# Patient Record
Sex: MALE | Race: WHITE | URBAN - METROPOLITAN AREA
[De-identification: names, ages, dates, MRNs, and addresses within clinical notes are randomized per-mention and may not be internally consistent; named-entity substitution may affect disease eponyms.]

---

## 2020-06-11 ENCOUNTER — OFFICE VISIT (OUTPATIENT)
Dept: URBAN - METROPOLITAN AREA CLINIC 54 | Facility: CLINIC | Age: 45
End: 2020-06-11

## 2020-06-12 ENCOUNTER — OFFICE VISIT (OUTPATIENT)
Dept: URBAN - METROPOLITAN AREA CLINIC 54 | Facility: CLINIC | Age: 45
End: 2020-06-12

## 2020-07-07 ENCOUNTER — TELEPHONE ENCOUNTER (OUTPATIENT)
Dept: URBAN - METROPOLITAN AREA CLINIC 54 | Facility: CLINIC | Age: 45
End: 2020-07-07

## 2020-07-07 RX ORDER — COLESEVELAM HYDROCHLORIDE 625 MG/1
TAKE 3 TABLETS (1,875 MG) BY ORAL ROUTE 2 TIMES PER DAY WITH MEALS AND LIQUID FOR 30 DAYS TABLET, FILM COATED ORAL 2 TIMES DAILY
Qty: 180 | Refills: 1

## 2020-07-08 ENCOUNTER — OFFICE VISIT (OUTPATIENT)
Dept: URBAN - METROPOLITAN AREA CLINIC 54 | Facility: CLINIC | Age: 45
End: 2020-07-08

## 2020-10-23 ENCOUNTER — WEB ENCOUNTER (OUTPATIENT)
Dept: URBAN - METROPOLITAN AREA CLINIC 54 | Facility: CLINIC | Age: 45
End: 2020-10-23

## 2020-10-23 ENCOUNTER — OFFICE VISIT (OUTPATIENT)
Dept: URBAN - METROPOLITAN AREA CLINIC 54 | Facility: CLINIC | Age: 45
End: 2020-10-23
Payer: COMMERCIAL

## 2020-10-23 DIAGNOSIS — K52.9 CHRONIC DIARRHEA: ICD-10-CM

## 2020-10-23 DIAGNOSIS — K58.9 IBS (IRRITABLE BOWEL SYNDROME): ICD-10-CM

## 2020-10-23 PROCEDURE — G8427 DOCREV CUR MEDS BY ELIG CLIN: HCPCS | Performed by: INTERNAL MEDICINE

## 2020-10-23 PROCEDURE — G8482 FLU IMMUNIZE ORDER/ADMIN: HCPCS | Performed by: INTERNAL MEDICINE

## 2020-10-23 PROCEDURE — G9903 PT SCRN TBCO ID AS NON USER: HCPCS | Performed by: INTERNAL MEDICINE

## 2020-10-23 PROCEDURE — 91065 BREATH HYDROGEN/METHANE TEST: CPT | Performed by: INTERNAL MEDICINE

## 2020-10-23 PROCEDURE — 99214 OFFICE O/P EST MOD 30 MIN: CPT | Performed by: INTERNAL MEDICINE

## 2020-10-23 PROCEDURE — G8420 CALC BMI NORM PARAMETERS: HCPCS | Performed by: INTERNAL MEDICINE

## 2020-10-23 RX ORDER — COLESEVELAM HYDROCHLORIDE 625 MG/1
TAKE 3 TABLETS (1,875 MG) BY ORAL ROUTE 2 TIMES PER DAY WITH MEALS AND LIQUID TABLET, FILM COATED ORAL 2 TIMES DAILY
Qty: 360 | Refills: 3

## 2020-10-23 RX ORDER — COLESEVELAM HYDROCHLORIDE 625 MG/1
TAKE 3 TABLETS (1,875 MG) BY ORAL ROUTE 2 TIMES PER DAY WITH MEALS AND LIQUID FOR 30 DAYS TABLET, FILM COATED ORAL 2 TIMES DAILY
Qty: 180 | Refills: 1 | Status: ACTIVE | COMMUNITY

## 2020-10-23 RX ORDER — CETIRIZINE HYDROCHLORIDE 10 MG/1
TAKE 1 TABLET (10 MG) BY ORAL ROUTE ONCE DAILY TABLET, FILM COATED ORAL 1
Qty: 0 | Refills: 0 | Status: ACTIVE | COMMUNITY
Start: 1900-01-01

## 2020-10-23 NOTE — HPI-OTHER HISTORIES
>>prior visits  The patient is a 40 year old male, who presents in follow-up. On his initial visit on 9/8/15 he stated 2 months prior he began having intermittent epigastric pain. It at first was extreme prompting an ED visit at [facility]. He stated his gallbladder was evaluated and it was unremarkable. His pain resolved after a couple hours, but was recurrent. It reoccured with indigestion/burping/and upper abdominal distention. TUMS helped. He stated he had an H pylori testing by Dr. Gray and he believed it was negative. On his visit on 10/13/15 the epigastric pain had resolved after starting omeprazole 40 mg daily. On 10/13/15 he noted being sore around his diarphragm 4-5 days out of the week lasting for hours with no pattern. A HIDA scan on 11/17/15 noted a decrease in EF at 40%, but he did not have any symptoms at the time of gallbladder contraction. He took Ibuprofen 600-800 mg TID and his subcostal pain resolved. On todays visit he notes he has done well until 2 weeks ago. He began having "nervous stomach" and increased his amitriptyline to 10 mg daily. He had terrible nightmares on the 10 mg dose so he decreased by to 5 mg nightly. His nightmares resolved. He is now taking 5 mg QOD. He states he can hear "squirts" in his abdomen i.e. bowel sounds in a quiet room. He states he feels a burning sensation throughout his abdomen. He has 2-3 BMS every morning that can be watery. It can limit his function int he morning. He states he is not under alot of stress. He denies burping and abdominal distention today. He has noted in the past problems with a soft/loose stool in the am. It occurs post prandially 1-3 stools in the am. He has noted in the late pm after dinner that he will have distention/belching continuously if he eats a large meal. Given these symptoms I initially treated him for IBS/functional dyspepsia with amitriptyline 10 mg at bedtime. He reduced it to a 1/2 tablet because of somnolence. After starting amitriptyline he did not need hysocyamine. He continued taking Imodium 1 pill nightly and his diarrhea improved. He decreased soda intake as well and his burping/abdominal distention were better. He gets alot of exercise at work shoveling alot. 5/29/18: Postprandial diarrhea and cramping better off dairy. Stopped amitryptyline due to vivid dreams.Son with lactase deficiency. GB EF 20%. Normal EGD and colon. Moderate stress. 2/5/19: Classic IBS-D symptoms. Cramping and audible bowel sounds. No response with VSL and FODMAP diet. No alarms ymptoms. Normal GB US but worried about GB. Denies stress. Breath test negative. >>1/7/2020 Much less diarrhea on Welchol, often only 1 formed stool/day on most days. No bleeding, rare RUQ discomfort after greasy food.

## 2020-10-23 NOTE — HPI-TODAY'S VISIT:
Follow-up for this 45-year-old male with ongoing abdominal symptoms intermittent diarrhea and cramping. He has a long history of the same symptoms although recently become worse despite taking WelChol twice daily.  He tried reducing the WelChol but had increasing symptoms and is increased again.  He recently had taken some antibiotics but symptoms were occurring prior to the antibiotic usage.  He has noticed possible intolerance to caffeine or fructose.  He previously had a normal lactose tolerance test and normal upper endoscopy with biopsies and a normal colonoscopy with random biopsies.  He has had a borderline HIDA scan and a normal abdominal ultrasound.  His weight has been stable.  He feels generally well with no cardiac or respiratory problems.

## 2020-12-29 ENCOUNTER — OFFICE VISIT (OUTPATIENT)
Dept: RURAL CLINIC 6 | Facility: CLINIC | Age: 45
End: 2020-12-29
Payer: COMMERCIAL

## 2020-12-29 DIAGNOSIS — K58.9 IBS (IRRITABLE BOWEL SYNDROME): ICD-10-CM

## 2020-12-29 PROCEDURE — G9903 PT SCRN TBCO ID AS NON USER: HCPCS | Performed by: INTERNAL MEDICINE

## 2020-12-29 PROCEDURE — G8420 CALC BMI NORM PARAMETERS: HCPCS | Performed by: INTERNAL MEDICINE

## 2020-12-29 PROCEDURE — 99213 OFFICE O/P EST LOW 20 MIN: CPT | Performed by: INTERNAL MEDICINE

## 2020-12-29 PROCEDURE — G8482 FLU IMMUNIZE ORDER/ADMIN: HCPCS | Performed by: INTERNAL MEDICINE

## 2020-12-29 PROCEDURE — G8427 DOCREV CUR MEDS BY ELIG CLIN: HCPCS | Performed by: INTERNAL MEDICINE

## 2020-12-29 RX ORDER — CETIRIZINE HYDROCHLORIDE 10 MG/1
TAKE 1 TABLET (10 MG) BY ORAL ROUTE ONCE DAILY TABLET, FILM COATED ORAL 1
Qty: 0 | Refills: 0 | Status: ACTIVE | COMMUNITY
Start: 1900-01-01

## 2020-12-29 RX ORDER — COLESEVELAM HYDROCHLORIDE 625 MG/1
TAKE 3 TABLETS (1,875 MG) BY ORAL ROUTE 2 TIMES PER DAY WITH MEALS AND LIQUID TABLET, FILM COATED ORAL 2 TIMES DAILY
Qty: 360 | Refills: 3 | Status: ACTIVE | COMMUNITY

## 2020-12-29 NOTE — HPI-TODAY'S VISIT:
He has had sinus infection twice within the last few months and has postponed doing his SIBO test because of the antibiotic treatment he has been receiving.  He has noted intolerance to additional foods particularly onions and garlic.  He is actually had some improvement in digestive symptoms after the antibiotic regimens.  He has not had much diarrhea and has no bleeding and only mild gaseousness and pain.  His weight is remained stable.  He has no cardiac or respiratory issues.   >>last visit Follow-up for this 45-year-old male with ongoing abdominal symptoms intermittent diarrhea and cramping. He has a long history of the same symptoms although recently become worse despite taking WelChol twice daily.  He tried reducing the WelChol but had increasing symptoms and is increased again.  He recently had taken some antibiotics but symptoms were occurring prior to the antibiotic usage.  He has noticed possible intolerance to caffeine or fructose.  He previously had a normal lactose tolerance test and normal upper endoscopy with biopsies and a normal colonoscopy with random biopsies.  He has had a borderline HIDA scan and a normal abdominal ultrasound.  His weight has been stable.  He feels generally well with no cardiac or respiratory problems.

## 2021-04-27 ENCOUNTER — OFFICE VISIT (OUTPATIENT)
Dept: RURAL CLINIC 6 | Facility: CLINIC | Age: 46
End: 2021-04-27
Payer: COMMERCIAL

## 2021-04-27 ENCOUNTER — WEB ENCOUNTER (OUTPATIENT)
Dept: RURAL CLINIC 6 | Facility: CLINIC | Age: 46
End: 2021-04-27

## 2021-04-27 DIAGNOSIS — K58.9 IBS (IRRITABLE BOWEL SYNDROME): ICD-10-CM

## 2021-04-27 PROCEDURE — 99213 OFFICE O/P EST LOW 20 MIN: CPT | Performed by: INTERNAL MEDICINE

## 2021-04-27 RX ORDER — CETIRIZINE HYDROCHLORIDE 10 MG/1
TAKE 1 TABLET (10 MG) BY ORAL ROUTE ONCE DAILY TABLET, FILM COATED ORAL 1
Qty: 0 | Refills: 0 | Status: ACTIVE | COMMUNITY
Start: 1900-01-01

## 2021-04-27 RX ORDER — COLESEVELAM HYDROCHLORIDE 625 MG/1
TAKE 3 TABLETS (1,875 MG) BY ORAL ROUTE 2 TIMES PER DAY WITH MEALS AND LIQUID TABLET, FILM COATED ORAL 2 TIMES DAILY
Qty: 360 | Refills: 3 | Status: ACTIVE | COMMUNITY

## 2021-04-27 RX ORDER — HYOSCYAMINE SULFATE 0.125 MG
1 TABLET ON THE TONGUE AND ALLOW TO DISSOLVE  AS NEEDED TABLET,DISINTEGRATING ORAL
Qty: 40 | Refills: 1 | OUTPATIENT
Start: 2021-04-27 | End: 2021-06-26

## 2021-04-27 NOTE — HPI-TODAY'S VISIT:
45-year-old male with intermittent abdominal cramps and diarrhea often worse after garlic or sugary foods.  This occurs most often after the evening meal.  If he reads labels carefully he usually does well for a week or more with normal bowel movements and no digestive symptoms.  He has excessive gaseousness with these episodes.  His GI work-up has been unremarkable except for borderline low ejection fraction gallbladder.  He has gained weight.  He had Covid in November along with other family members.  He does not feel that he has had prolonged illness as a result of this.  Digestive symptoms occurred prior to Covid.  No active cardiac or respiratory or other health problems.

## 2021-06-07 NOTE — PHYSICAL EXAM NECK/THYROID:
normal appearance , without tenderness upon palpation , no deformities , trachea midline , Thyroid normal size , no thyroid nodules , no masses , no JVD , thyroid nontender
1

## 2021-10-19 ENCOUNTER — OFFICE VISIT (OUTPATIENT)
Dept: RURAL CLINIC 6 | Facility: CLINIC | Age: 46
End: 2021-10-19

## 2021-10-19 RX ORDER — CETIRIZINE HYDROCHLORIDE 10 MG/1
TAKE 1 TABLET (10 MG) BY ORAL ROUTE ONCE DAILY TABLET, FILM COATED ORAL 1
Qty: 0 | Refills: 0 | COMMUNITY
Start: 1900-01-01

## 2021-10-19 RX ORDER — COLESEVELAM HYDROCHLORIDE 625 MG/1
TAKE 3 TABLETS (1,875 MG) BY ORAL ROUTE 2 TIMES PER DAY WITH MEALS AND LIQUID TABLET, FILM COATED ORAL 2 TIMES DAILY
Qty: 360 | Refills: 3 | COMMUNITY

## 2022-01-11 ENCOUNTER — WEB ENCOUNTER (OUTPATIENT)
Dept: RURAL CLINIC 6 | Facility: CLINIC | Age: 47
End: 2022-01-11

## 2022-01-11 ENCOUNTER — OFFICE VISIT (OUTPATIENT)
Dept: RURAL CLINIC 6 | Facility: CLINIC | Age: 47
End: 2022-01-11
Payer: COMMERCIAL

## 2022-01-11 DIAGNOSIS — K58.9 IBS (IRRITABLE BOWEL SYNDROME): ICD-10-CM

## 2022-01-11 PROCEDURE — 99213 OFFICE O/P EST LOW 20 MIN: CPT | Performed by: INTERNAL MEDICINE

## 2022-01-11 RX ORDER — CETIRIZINE HYDROCHLORIDE 10 MG/1
TAKE 1 TABLET (10 MG) BY ORAL ROUTE ONCE DAILY TABLET, FILM COATED ORAL 1
Qty: 0 | Refills: 0 | Status: ACTIVE | COMMUNITY
Start: 1900-01-01

## 2022-01-11 RX ORDER — COLESEVELAM HYDROCHLORIDE 625 MG/1
TAKE 3 TABLETS (1,875 MG) BY ORAL ROUTE 2 TIMES PER DAY WITH MEALS AND LIQUID TABLET, FILM COATED ORAL 2 TIMES DAILY
Qty: 360 | Refills: 3 | Status: DISCONTINUED | COMMUNITY

## 2022-01-11 NOTE — HPI-TODAY'S VISIT:
This is a 46-year-old man with IBS-D who previously responded to WelChol but is no longer on it.  He has had upper and lower scopes in 2017 with biopsies of the stomach duodenum and colon and ileum that were all normal.  He has had intermittent diarrhea and cramping but no bleeding.  His weight has remained stable.  He has had testing for SIBO, celiac panel that was negative and a borderline abnormal HIDA scan. His wife had been experimenting with gluten free diet in order to try to manage psoriasis so he began using gluten-free diet as well and noted the marked improvement in digestive symptoms which became worse after reinstitution of gluten consumption.

## 2022-07-12 ENCOUNTER — OFFICE VISIT (OUTPATIENT)
Dept: RURAL CLINIC 6 | Facility: CLINIC | Age: 47
End: 2022-07-12

## 2022-08-10 ENCOUNTER — DASHBOARD ENCOUNTERS (OUTPATIENT)
Age: 47
End: 2022-08-10

## 2022-08-10 ENCOUNTER — OFFICE VISIT (OUTPATIENT)
Dept: URBAN - METROPOLITAN AREA CLINIC 54 | Facility: CLINIC | Age: 47
End: 2022-08-10
Payer: COMMERCIAL

## 2022-08-10 VITALS
HEART RATE: 77 BPM | BODY MASS INDEX: 26.6 KG/M2 | TEMPERATURE: 97.6 F | HEIGHT: 71 IN | DIASTOLIC BLOOD PRESSURE: 76 MMHG | SYSTOLIC BLOOD PRESSURE: 126 MMHG | WEIGHT: 190 LBS

## 2022-08-10 DIAGNOSIS — K58.9 IBS (IRRITABLE BOWEL SYNDROME): ICD-10-CM

## 2022-08-10 DIAGNOSIS — R14.0 BLOATING: ICD-10-CM

## 2022-08-10 DIAGNOSIS — K52.9 CHRONIC DIARRHEA: ICD-10-CM

## 2022-08-10 PROBLEM — 10743008 IRRITABLE BOWEL SYNDROME: Status: ACTIVE | Noted: 2020-10-23

## 2022-08-10 PROCEDURE — 91065 BREATH HYDROGEN/METHANE TEST: CPT | Performed by: INTERNAL MEDICINE

## 2022-08-10 PROCEDURE — 99213 OFFICE O/P EST LOW 20 MIN: CPT | Performed by: INTERNAL MEDICINE

## 2022-08-10 RX ORDER — OMEPRAZOLE 40 MG/1
1 CAPSULE 30 MINUTES BEFORE MORNING MEAL CAPSULE, DELAYED RELEASE ORAL ONCE A DAY
Status: ACTIVE | COMMUNITY

## 2022-08-10 RX ORDER — CETIRIZINE HYDROCHLORIDE 10 MG/1
TAKE 1 TABLET (10 MG) BY ORAL ROUTE ONCE DAILY TABLET, FILM COATED ORAL 1
Qty: 0 | Refills: 0 | Status: DISCONTINUED | COMMUNITY
Start: 1900-01-01

## 2022-08-10 NOTE — HPI-TODAY'S VISIT:
symptoms of chronic GI issues for the least past 5 years.  He has been tested for celiac disease with extensive blood testing that was negative.  He does believe that gluten avoidance may help his symptoms he also suspected he may have lactose intolerance and by avoiding lactose symptoms are much less.  Most commonly he notes when he is driving after 10 minutes or so he developed belching abdominal bloating and then have to stop because of diarrhea and then feels very sleepy for a while.  He has had no skin rashes no nausea or vomiting and no weight change.  There have been no GI bleeding.  Upper endoscopy and colonoscopy with biopsies were negative in 2017.

## 2022-08-17 ENCOUNTER — TELEPHONE ENCOUNTER (OUTPATIENT)
Dept: URBAN - METROPOLITAN AREA CLINIC 54 | Facility: CLINIC | Age: 47
End: 2022-08-17

## 2022-08-17 ENCOUNTER — TELEPHONE ENCOUNTER (OUTPATIENT)
Dept: URBAN - METROPOLITAN AREA CLINIC 92 | Facility: CLINIC | Age: 47
End: 2022-08-17

## 2022-08-17 RX ORDER — HYOSCYAMINE SULFATE 0.125 MG
1 TABLET ON THE TONGUE AND ALLOW TO DISSOLVE  AS NEEDED TABLET,DISINTEGRATING ORAL
Qty: 60 | Refills: 1
End: 2022-10-16

## 2022-08-17 RX ORDER — HYOSCYAMINE SULFATE 0.125 MG
1 TABLET ON THE TONGUE AND ALLOW TO DISSOLVE  AS NEEDED TABLET,DISINTEGRATING ORAL
Qty: 60 | Refills: 1

## 2022-12-14 LAB
COPROPOR(CP)III,24HR: 51.2
COPROPORPH(CP)I,24HR: 27.9
HEPTACARB(7-CP),24HR: 2
HEXACARB(6-CP),24HR: (no result)
INTERPRETATION: (no result)
PANCREATIC ELASTASE, FECAL: >500
PENTACARB(5-CP),24HR: 1.4
TOTAL PORPHYRINS: 95.3
TOTAL VOLUME: (no result)
UROPORPHYRIN I: 7.5
UROPORPHYRIN III: 5.3

## 2024-09-06 ENCOUNTER — APPOINTMENT (RX ONLY)
Dept: URBAN - METROPOLITAN AREA OTHER 13 | Facility: OTHER | Age: 49
Setting detail: DERMATOLOGY
End: 2024-09-06

## 2024-09-06 DIAGNOSIS — Z71.89 OTHER SPECIFIED COUNSELING: ICD-10-CM

## 2024-09-06 DIAGNOSIS — L82.1 OTHER SEBORRHEIC KERATOSIS: ICD-10-CM

## 2024-09-06 DIAGNOSIS — R21 RASH AND OTHER NONSPECIFIC SKIN ERUPTION: ICD-10-CM

## 2024-09-06 DIAGNOSIS — L82.0 INFLAMED SEBORRHEIC KERATOSIS: ICD-10-CM

## 2024-09-06 PROCEDURE — 99203 OFFICE O/P NEW LOW 30 MIN: CPT | Mod: 25

## 2024-09-06 PROCEDURE — ? PRESCRIPTION

## 2024-09-06 PROCEDURE — ? COUNSELING

## 2024-09-06 PROCEDURE — ? LIQUID NITROGEN

## 2024-09-06 PROCEDURE — 17110 DESTRUCTION B9 LES UP TO 14: CPT

## 2024-09-06 RX ORDER — TRIAMCINOLONE ACETONIDE 1 MG/G
CREAM TOPICAL BID
Qty: 454 | Refills: 1 | Status: ERX | COMMUNITY
Start: 2024-09-06

## 2024-09-06 RX ADMIN — TRIAMCINOLONE ACETONIDE: 1 CREAM TOPICAL at 00:00

## 2024-09-06 ASSESSMENT — LOCATION SIMPLE DESCRIPTION DERM
LOCATION SIMPLE: LEFT UPPER ARM
LOCATION SIMPLE: RIGHT AXILLARY VAULT
LOCATION SIMPLE: LEFT AXILLARY VAULT
LOCATION SIMPLE: RIGHT UPPER BACK
LOCATION SIMPLE: LEFT UPPER BACK

## 2024-09-06 ASSESSMENT — LOCATION DETAILED DESCRIPTION DERM
LOCATION DETAILED: LEFT INFERIOR MEDIAL UPPER BACK
LOCATION DETAILED: LEFT AXILLARY VAULT
LOCATION DETAILED: RIGHT MID-UPPER BACK
LOCATION DETAILED: RIGHT AXILLARY VAULT
LOCATION DETAILED: LEFT PROXIMAL POSTERIOR UPPER ARM

## 2024-09-06 ASSESSMENT — LOCATION ZONE DERM
LOCATION ZONE: AXILLAE
LOCATION ZONE: ARM
LOCATION ZONE: TRUNK

## 2024-09-06 NOTE — PROCEDURE: LIQUID NITROGEN
Post-Care Instructions: I reviewed with the patient in detail post-care instructions. Patient is to wear sunprotection, and avoid picking at any of the treated lesions. Pt may apply Vaseline to crusted or scabbing areas.
Show Applicator Variable?: Yes
Spray Paint Text: The liquid nitrogen was applied to the skin utilizing a spray paint frosting technique.
Medical Necessity Information: It is in your best interest to select a reason for this procedure from the list below. All of these items fulfill various CMS LCD requirements except the new and changing color options.
Add 52 Modifier (Optional): no
Consent: The patient's consent was obtained including but not limited to risks of crusting, scabbing, blistering, scarring, darker or lighter pigmentary change, recurrence, incomplete removal and infection.
Medical Necessity Clause: This procedure was medically necessary because the lesions that were treated were:
Detail Level: Detailed